# Patient Record
Sex: MALE | Race: WHITE
[De-identification: names, ages, dates, MRNs, and addresses within clinical notes are randomized per-mention and may not be internally consistent; named-entity substitution may affect disease eponyms.]

---

## 2018-10-30 ENCOUNTER — HOSPITAL ENCOUNTER (OUTPATIENT)
Dept: HOSPITAL 80 - FSGY | Age: 48
Discharge: HOME | End: 2018-10-30
Attending: ORTHOPAEDIC SURGERY
Payer: MEDICAID

## 2018-10-30 VITALS — SYSTOLIC BLOOD PRESSURE: 120 MMHG | DIASTOLIC BLOOD PRESSURE: 87 MMHG
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
## 2018-10-30 DIAGNOSIS — Z87.891: ICD-10-CM

## 2018-10-30 DIAGNOSIS — F32.9: ICD-10-CM

## 2018-10-30 DIAGNOSIS — K21.9: ICD-10-CM

## 2018-10-30 DIAGNOSIS — S92.351A: Primary | ICD-10-CM

## 2018-10-30 PROCEDURE — C1713 ANCHOR/SCREW BN/BN,TIS/BN: HCPCS

## 2018-10-30 PROCEDURE — 0QSN04Z REPOSITION RIGHT METATARSAL WITH INTERNAL FIXATION DEVICE, OPEN APPROACH: ICD-10-PCS | Performed by: ORTHOPAEDIC SURGERY

## 2018-10-30 RX ADMIN — OXYCODONE HYDROCHLORIDE AND ACETAMINOPHEN PRN TAB: 5; 325 TABLET ORAL at 18:58

## 2018-10-30 RX ADMIN — Medication PRN MCG: at 18:40

## 2018-10-30 RX ADMIN — Medication PRN MCG: at 18:13

## 2018-10-30 RX ADMIN — Medication PRN MCG: at 18:21

## 2018-10-30 RX ADMIN — OXYCODONE HYDROCHLORIDE AND ACETAMINOPHEN PRN TAB: 5; 325 TABLET ORAL at 18:20

## 2018-10-30 NOTE — PDANEPAE
ANE History of Present Illness





R 5th Metatarsal





ANE Past Medical History





- Cardiovascular History


Hx Hypertension: No


Hx Arrhythmias: No


Hx Chest Pain: No


Hx Coronary Artery / Peripheral Vascular Disease: No


Hx CHF / Valvular Disease: No


Hx Palpitations: No





- Pulmonary History


Hx COPD: No


Hx Asthma/Reactive Airway Disease: No


Hx Recent Upper Respiratory Infection: No


Hx Oxygen in Use at Home: No


Hx Sleep Apnea: No


Sleep Apnea Screening Result - Last Documented: Negative





- Neurologic History


Hx Cerebrovascular Accident: No


Hx Seizures: No


Hx Dementia: No





- Endocrine History


Hx Diabetes: No





- Renal History


Hx Renal Disorders: No





- Liver History


Hx Hepatic Disorders: No





- Neurological & Psychiatric Hx


Hx Neurological and Psychiatric Disorders: Yes


Neurological / Psychiatric History Comment: depression, anxiety





- Cancer History


Hx Cancer: No





- Congenital Disorder History


Hx Congenital Disorders: No





- GI History


Hx Gastrointestinal Disorders: Yes


Gastrointestinal History Comment: GERD





- Other Health History


Other Health History: none





- Chronic Pain History


Chronic Pain: No (back pain but not chronic)





- Surgical History


Prior Surgeries: umbilical hernia repair.  tonsillectomy.  colonoscopies x 2





ANE Review of Systems


Review of Systems: 








- Exercise capacity


METS (RN): 5 METS





ANE Patient History





- Allergies


Allergies/Adverse Reactions: 








Penicillins Allergy (Verified 10/29/18 11:44)


 not sure reaction, was since a small child








- Home Medications


Home Medications: 








Calcium  10/29/18 [Last Taken 2 Days Ago ~10/28/18]


Omeprazole  10/29/18 [Last Taken 1 Day Ago ~10/29/18]


Vitamin C  10/29/18 [Last Taken 2 Days Ago ~10/28/18]


Vitamin D3  10/29/18 [Last Taken 2 Days Ago ~10/28/18]


Vitamin K2  10/29/18 [Last Taken 2 Days Ago ~10/28/18]








- NPO status


NPO Since - Liquids (Date): 10/30/18


NPO Since - Liquids (Time): 12:30


NPO Since - Solids (Date): 10/30/18


NPO Since - Solids (Time): 06:30





- Smoking Hx


Smoking Status: Former smoker





- Family Anes Hx


Family Hx Anesthesia Complications: none





ANE Labs/Vital Signs





- Vital Signs


Blood Pressure: 127/92


Heart Rate: 78


Respiratory Rate: 15


O2 Sat (%): 97


Height: 177.8 cm


Weight: 79.379 kg





ANE Physical Exam





- Airway


Neck exam: FROM


Mallampati Score: Class 2


Mouth exam: normal dental/mouth exam





- Pulmonary


Pulmonary: clear to auscultation





- Cardiovascular


Cardiovascular: regular rate and rhythym





- ASA Status


ASA Status: I





ANE Anesthesia Plan


Anesthesia Plan: general endotracheal anesthesia

## 2018-10-30 NOTE — PDGENHP
History & Physical


Chief Complaint: Right fifth metatarsal base fracture


History of Present Illness: Dane is a pleasant 47 year old male who 

presented to our office 5 weeks ago with fifth metatarsal base Aburto fracture. 

He had been NWB in the boot and returned to our office. Radiographs taken show 

displacement of fracture with no significant callous formation. Risks, benefits

, and alternatives to continued boot immobilization vs surgical intervention 

were discussed and patient would like to proceed with sugical intervention.


Pertinent Past, Social, Family History: PMH: depression, GERD, hernia.  Social 

history: former smoker.  Family history: non-contributory


Relevant Physical Exam: Upon evaluation of the right foot, he does have mild 

swelling overlying the fifth metatarsal base. He is point tender overlying the 

fifth metarsal base. Nontender throughout the rest of the forefoot and midfoot. 

Mild pain with passive inversion of the foot as well as active eversion. NV 

intact RLE


Cardiorespiratory Assessment: RRR, CTAB

## 2018-10-30 NOTE — POSTOPPROG
Post Op Note


Date of Operation: 10/30/18


Surgeon: Fernando Ivey


Assistant: Cherri Arce


Anesthesiologist: Kathy


Anesthesia: GET(General Endotracheal)


Pre-op Diagnosis: Right fifth metatarsal fracture


Post-op Diagnosis: Right fifth metatarsal fracture


Procedure: ORIF right fifth metatarsal fracture


Inf/Abcess present in the surg proc area at time of surgery?: No


Depth: Superfical  (Skin SQ)


EBL: Minimal

## 2018-10-31 NOTE — GOP
DATE OF OPERATION:  10/30/2018



SURGEON:  Fernando Ivey MD



ASSISTANT:  Cherri Sim.



ANESTHESIA:  General.



ANESTHESIOLOGIST:  Geoff Chavez DO



PREOPERATIVE DIAGNOSIS:  Displaced base of fifth metatarsal fracture on the right foot.



POSTOPERATIVE DIAGNOSIS:  Displaced base of fifth metatarsal fracture on the right foot.



PROCEDURE PERFORMED:  Open reduction, internal fixation, fifth metatarsal fracture, right foot.



FINDINGS:  





ESTIMATED BLOOD LOSS:  Minimal.



DESCRIPTION OF PROCEDURE:  After appropriate informed consent was obtained, the patient was taken to 
the operating room, placed supine on the operating table.  A time-out was performed.  The patient was
 identified, correct site was identified, matched with the radiographs available in the room.  He rec
eived 900 mg of clindamycin due to his penicillin allergy.  Right foot was prepped and draped in usua
l sterile fashion.  I exsanguinated the limb, inflated the tourniquet to 250 mmHg.  I made an incisio
n about 2 cm proximal to the base of the fifth metatarsal head, avoiding the peroneal tendons.  I was
 able to get a guidewire from the tip of the fifth metatarsal into the shaft.  This was confirmed wit
h AP lateral fluoroscopic imaging.  We then drilled the near cortex and then tapped to 4.5, to the de
pth of 50 mm.  I then chose a 5.5 mm partially threaded screw and gently compressed the fracture and 
confirmed its position with both AP and lateral fluoroscopic imaging.  The wound was irrigated, close
d with 2-0, Vicryl 3-0 Monocryl, and Dermabond.  I instilled 20 mL of 0.5% Marcaine plain around the 
incision.  Soft sterile dressing was applied.  The patient was awakened from anesthesia, taken to the
 recovery room in satisfactory condition.  There were no immediate intraoperative complications.  Lois Sim's assistance was required throughout the entire case.



TOTAL TOURNIQUET TIME:  26 minutes.



IMPLANTS USED:  Arthrex 5.5 mm partially threaded 50 mm long screw.



COMPLICATIONS:  None.



DRAINS:  None.



HISTORY:  The patient is a 47-year-old male who, about a week and a half ago, had a twisting injury w
hile hiking and sustained a fifth metatarsal fracture.  Initially, this was nondisplaced, however, it
 went on to displaced, and the decision was made to proceed with open reduction, internal fixation.





Job #:  [id number]